# Patient Record
Sex: FEMALE | Race: BLACK OR AFRICAN AMERICAN | HISPANIC OR LATINO | URBAN - METROPOLITAN AREA
[De-identification: names, ages, dates, MRNs, and addresses within clinical notes are randomized per-mention and may not be internally consistent; named-entity substitution may affect disease eponyms.]

---

## 2020-06-26 ENCOUNTER — OUT OF OFFICE VISIT (OUTPATIENT)
Dept: URBAN - METROPOLITAN AREA MEDICAL CENTER 25 | Facility: MEDICAL CENTER | Age: 35
End: 2020-06-26
Payer: COMMERCIAL

## 2020-06-26 ENCOUNTER — LAB OUTSIDE AN ENCOUNTER (OUTPATIENT)
Dept: URBAN - METROPOLITAN AREA CLINIC 19 | Facility: CLINIC | Age: 35
End: 2020-06-26

## 2020-06-26 DIAGNOSIS — R74.0 ABNORMAL AST AND ALT: ICD-10-CM

## 2020-06-26 DIAGNOSIS — O99.612 DISEASES OF THE DIGESTIVE SYSTEM COMPLICATING PREGNANCY, SECOND TRIMESTER: ICD-10-CM

## 2020-06-26 DIAGNOSIS — K80.20 ASYMPTOMATIC CHOLELITHIASIS: ICD-10-CM

## 2020-06-26 DIAGNOSIS — Z3A.14 14 WEEKS GESTATION OF PREGNANCY: ICD-10-CM

## 2020-06-26 PROCEDURE — 99254 IP/OBS CNSLTJ NEW/EST MOD 60: CPT | Performed by: INTERNAL MEDICINE

## 2020-08-07 ENCOUNTER — OFFICE VISIT (OUTPATIENT)
Dept: URBAN - METROPOLITAN AREA CLINIC 128 | Facility: CLINIC | Age: 35
End: 2020-08-07
Payer: COMMERCIAL

## 2020-08-07 DIAGNOSIS — Z34.90 PREGNANCY: ICD-10-CM

## 2020-08-07 DIAGNOSIS — R94.5 ABNORMAL LFTS: ICD-10-CM

## 2020-08-07 PROCEDURE — 82977 ASSAY OF GGT: CPT | Performed by: INTERNAL MEDICINE

## 2020-08-07 PROCEDURE — 99244 OFF/OP CNSLTJ NEW/EST MOD 40: CPT | Performed by: INTERNAL MEDICINE

## 2020-08-07 PROCEDURE — 99204 OFFICE O/P NEW MOD 45 MIN: CPT | Performed by: INTERNAL MEDICINE

## 2020-08-07 NOTE — PHYSICAL EXAM GASTROINTESTINAL
Abdomen ,soft, gravid uterus , palpable above supraumbilical region, no RUQ tenderness, well healed laparoscopic port scars.

## 2020-08-07 NOTE — HPI-TODAY'S VISIT:
has been referred by . She is pregnant with twins and is in her second trimester. . She has had cholelithiasis and had laparoscopic cholecystectomy. She returned with epigastric pain to ER. Her LFTs were elevated with AST/ALT in 100's. She had an MRCP Which showed thin normal bile duct with no filling defects.  Her pain resolved and she feels fine now.  No dark urine or fever.

## 2020-08-10 ENCOUNTER — LAB OUTSIDE AN ENCOUNTER (OUTPATIENT)
Dept: URBAN - METROPOLITAN AREA CLINIC 19 | Facility: CLINIC | Age: 35
End: 2020-08-10

## 2020-08-22 LAB
A/G RATIO: 1.2
ALBUMIN: 3.3
ALKALINE PHOSPHATASE: 82
ALT (SGPT): 9
AST (SGOT): 16
BILIRUBIN, TOTAL: 1.2
BUN/CREATININE RATIO: 4
BUN: 2
CALCIUM: 9.1
CARBON DIOXIDE, TOTAL: 19
CHLORIDE: 104
CREATININE: 0.54
EGFR IF AFRICN AM: 143
EGFR IF NONAFRICN AM: 124
GGT: 16
GLOBULIN, TOTAL: 2.7
GLUCOSE: 95
HBSAB QUANT HBIG ASSESSMENT: <3.1
HEP B SURFACE ANTIGEN QUANT: (no result)
HEP C VIRUS AB: <0.1
POTASSIUM: 4.1
PROTEIN, TOTAL: 6
SODIUM: 136

## 2023-08-11 ENCOUNTER — DASHBOARD ENCOUNTERS (OUTPATIENT)
Age: 38
End: 2023-08-11

## 2023-08-11 ENCOUNTER — OFFICE VISIT (OUTPATIENT)
Dept: URBAN - METROPOLITAN AREA TELEHEALTH 2 | Facility: TELEHEALTH | Age: 38
End: 2023-08-11
Payer: COMMERCIAL

## 2023-08-11 ENCOUNTER — WEB ENCOUNTER (OUTPATIENT)
Dept: URBAN - METROPOLITAN AREA TELEHEALTH 2 | Facility: TELEHEALTH | Age: 38
End: 2023-08-11

## 2023-08-11 VITALS — BODY MASS INDEX: 41.83 KG/M2 | WEIGHT: 245 LBS | HEIGHT: 64 IN

## 2023-08-11 DIAGNOSIS — R11.0 NAUSEA: ICD-10-CM

## 2023-08-11 DIAGNOSIS — E66.9 OBESITY (BMI 30-39.9): ICD-10-CM

## 2023-08-11 DIAGNOSIS — Z90.49 HISTORY OF CHOLECYSTECTOMY: ICD-10-CM

## 2023-08-11 PROBLEM — 162864005: Status: ACTIVE | Noted: 2023-08-11

## 2023-08-11 PROBLEM — 428882003: Status: ACTIVE | Noted: 2023-08-11

## 2023-08-11 PROCEDURE — 99213 OFFICE O/P EST LOW 20 MIN: CPT | Performed by: INTERNAL MEDICINE

## 2023-08-11 RX ORDER — OMEPRAZOLE 40 MG/1
1 CAPSULE 30 MINUTES BEFORE MORNING MEAL CAPSULE, DELAYED RELEASE ORAL ONCE A DAY
Qty: 30 | Refills: 5 | OUTPATIENT
Start: 2023-08-11

## 2023-08-11 RX ORDER — ONDANSETRON 4 MG/1
TABLET, ORALLY DISINTEGRATING ORAL
Qty: 9 EACH | Status: ON HOLD | COMMUNITY

## 2023-08-11 RX ORDER — SEMAGLUTIDE 2.4 MG/.75ML
INJECTION, SOLUTION SUBCUTANEOUS
Qty: 3 UNSPECIFIED | Status: ON HOLD | COMMUNITY

## 2023-08-11 RX ORDER — PROCHLORPERAZINE 25 MG/1
INSERT 1 SUPPOSITORY RECTALLY TWICE A DAY FOR 7 DAYS SUPPOSITORY RECTAL
Qty: 14 EACH | Refills: 0 | Status: ON HOLD | COMMUNITY

## 2023-08-11 RX ORDER — BUPROPION HYDROCHLORIDE 150 MG/1
TABLET, EXTENDED RELEASE ORAL
Qty: 90 TABLET | Status: ACTIVE | COMMUNITY

## 2023-08-11 NOTE — HPI-TODAY'S VISIT:
s/p CCY during pregnancy, had f/u liver enzymes  Presents today, on Wegovy since January/March, was very sick with this, +vomiting. Got bad a couple of weeks ago, went to ER, given nausea med- New med has helped Not able to eat or drink very much at all.  Yesterday had some tea and salad.  Nothing today.  Wegovy was stopped 2 wks ago.  zofran not effective, then phenergan suppository from ER, then given compazine suppository

## 2023-08-30 ENCOUNTER — TELEPHONE ENCOUNTER (OUTPATIENT)
Dept: URBAN - METROPOLITAN AREA CLINIC 80 | Facility: CLINIC | Age: 38
End: 2023-08-30

## 2023-08-30 RX ORDER — OMEPRAZOLE 40 MG/1
1 CAPSULE 30 MINUTES BEFORE MORNING MEAL CAPSULE, DELAYED RELEASE ORAL ONCE A DAY
Qty: 90 | Refills: 3
Start: 2023-08-11

## 2023-09-01 ENCOUNTER — TELEPHONE ENCOUNTER (OUTPATIENT)
Dept: URBAN - METROPOLITAN AREA CLINIC 80 | Facility: CLINIC | Age: 38
End: 2023-09-01

## 2023-09-01 RX ORDER — OMEPRAZOLE 40 MG/1
1 CAPSULE 30 MINUTES BEFORE MORNING MEAL CAPSULE, DELAYED RELEASE ORAL ONCE A DAY
Qty: 90 | Refills: 3
Start: 2023-08-11